# Patient Record
Sex: MALE | URBAN - NONMETROPOLITAN AREA
[De-identification: names, ages, dates, MRNs, and addresses within clinical notes are randomized per-mention and may not be internally consistent; named-entity substitution may affect disease eponyms.]

---

## 2020-09-28 ENCOUNTER — OFFICE VISIT (OUTPATIENT)
Dept: URBAN - NONMETROPOLITAN AREA CLINIC 18 | Facility: CLINIC | Age: 25
End: 2020-09-28
Payer: COMMERCIAL

## 2020-09-28 DIAGNOSIS — H51.11 CONVERGENCE INSUFFICIENCY: Primary | ICD-10-CM

## 2020-09-28 PROCEDURE — 99203 OFFICE O/P NEW LOW 30 MIN: CPT | Performed by: OPTOMETRIST

## 2020-09-28 ASSESSMENT — INTRAOCULAR PRESSURE
OD: 16
OS: 16

## 2020-09-28 ASSESSMENT — VISUAL ACUITY
OS: 20/20
OD: 20/20

## 2020-09-28 ASSESSMENT — KERATOMETRY
OS: 42.38
OD: 42.00

## 2020-09-28 NOTE — IMPRESSION/PLAN
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 Plan: Discussed status in detail with patient. Ed pt on option of vision therapy, though there are no nearby providers. Discussed option of prism for some relief of strain and blur/doubling at near. Recommend further binocular testing when eyes are not dilated to determine prism rx.

## 2020-09-28 NOTE — IMPRESSION/PLAN
Impression: Headache: R51. Plan: Reviewed headache concerns with patient in detail. No ocular or visual abnormalities found during examination attributed to headache origin. Recommended follow-up with binocular testing. If prism spec rx does not improve HA, consider VF 30-2. Also recommended follow up with Primary Care Physician if headache continues.

## 2020-10-05 ENCOUNTER — OFFICE VISIT (OUTPATIENT)
Dept: URBAN - NONMETROPOLITAN AREA CLINIC 18 | Facility: CLINIC | Age: 25
End: 2020-10-05
Payer: COMMERCIAL

## 2020-10-05 DIAGNOSIS — R51 HEADACHE: Chronic | ICD-10-CM

## 2020-10-05 DIAGNOSIS — H52.01 HYPERMETROPIA, RIGHT EYE: Primary | ICD-10-CM

## 2020-10-05 DIAGNOSIS — H52.222 REGULAR ASTIGMATISM, LEFT EYE: Chronic | ICD-10-CM

## 2020-10-05 PROCEDURE — 92014 COMPRE OPH EXAM EST PT 1/>: CPT | Performed by: OPTOMETRIST

## 2020-10-05 ASSESSMENT — VISUAL ACUITY
OD: 20/20
OS: 20/20

## 2020-10-05 NOTE — IMPRESSION/PLAN
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 Plan: Near binocular work-up completed: NRA: +2.00; BCC: +1.25; PRA: -3.50; Phoria vert: ortho horiz: 5 exo; BI: 10/18/13; ISRAEL: 12/14/6. Due to reduced [place] vergence ranges and NPC 8 inches, pt would benefit from treatment of CI. Discussed status in detail with patient. Ed pt on option of vision therapy, though there are no nearby providers. Recommend glasses Rx with prism. Pt felt the prism trial was more clear and comfortable.  Ok to release prism rx for FTW

## 2020-10-05 NOTE — IMPRESSION/PLAN
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 Plan: Reviewed refractive prescription in detail with patient and need for glasses with low prism to improve vision at this time. Discussed lens options and designs. Ok to release spectacle prescription.

## 2020-10-05 NOTE — IMPRESSION/PLAN
Impression: Headache: R51. Plan: Reviewed headache concerns with patient in detail. No ocular or visual abnormalities found during examination attributed to headache origin other than CI (above). Recommended glasses Rx with prism. If prism spec rx does not improve HA, consider VF 30-2. Also recommended follow up with Primary Care Physician if headache continues.

## 2021-10-18 ENCOUNTER — OFFICE VISIT (OUTPATIENT)
Dept: URBAN - NONMETROPOLITAN AREA CLINIC 18 | Facility: CLINIC | Age: 26
End: 2021-10-18
Payer: COMMERCIAL

## 2021-10-18 PROCEDURE — 99213 OFFICE O/P EST LOW 20 MIN: CPT | Performed by: OPTOMETRIST

## 2021-10-18 ASSESSMENT — INTRAOCULAR PRESSURE
OD: 14
OS: 14

## 2021-10-18 NOTE — IMPRESSION/PLAN
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 Plan: Discussed status in detail with patient. Ed pt on option of vision therapy, though there are no nearby providers. Ed pt on how to do pencil pushups using 2 diff. colored pens/pencils to be aware of physiologic diplopia. Recommend pt continue with prism rx. RTC if symptoms experienced. Will recheck stereo and NPC at follow-up/refraction in 2-4 weeks.

## 2023-07-26 ENCOUNTER — OFFICE VISIT (OUTPATIENT)
Dept: URBAN - NONMETROPOLITAN AREA CLINIC 18 | Facility: CLINIC | Age: 28
End: 2023-07-26
Payer: COMMERCIAL

## 2023-07-26 DIAGNOSIS — H51.11 CONVERGENCE INSUFFICIENCY: Primary | ICD-10-CM

## 2023-07-26 DIAGNOSIS — H52.01 HYPERMETROPIA, RIGHT EYE: ICD-10-CM

## 2023-07-26 PROCEDURE — 99213 OFFICE O/P EST LOW 20 MIN: CPT | Performed by: OPTOMETRIST

## 2023-07-26 ASSESSMENT — INTRAOCULAR PRESSURE
OD: 14
OS: 14